# Patient Record
Sex: FEMALE | Race: WHITE | NOT HISPANIC OR LATINO | URBAN - NONMETROPOLITAN AREA
[De-identification: names, ages, dates, MRNs, and addresses within clinical notes are randomized per-mention and may not be internally consistent; named-entity substitution may affect disease eponyms.]

---

## 2017-02-08 PROBLEM — H52.4: Noted: 2017-02-08

## 2017-02-08 PROBLEM — E11.9: Noted: 2017-02-08

## 2017-02-08 PROBLEM — H52.223: Noted: 2017-02-08

## 2017-02-08 PROBLEM — H52.03: Noted: 2017-02-08

## 2017-02-08 PROBLEM — H25.13: Noted: 2017-02-08

## 2020-08-21 ENCOUNTER — IMPORTED ENCOUNTER (OUTPATIENT)
Dept: URBAN - NONMETROPOLITAN AREA CLINIC 1 | Facility: CLINIC | Age: 75
End: 2020-08-21

## 2020-08-21 PROCEDURE — 92015 DETERMINE REFRACTIVE STATE: CPT

## 2020-08-21 PROCEDURE — 92014 COMPRE OPH EXAM EST PT 1/>: CPT

## 2020-08-21 NOTE — PATIENT DISCUSSION
DM s DR-Stressed the importance of keeping blood sugars under control and regular visits with PCP. -Explained the possible effects of poorly controlled diabetes and the damage that diabetes can cause to ocular health. -Pt instructed to contact our office with any vision changes. Cataract OU +progression-Not yet surgical. -Reviewed symptoms of advancing cataract growth such as glare and halos and decreased vision.-Continue to monitor for now. Pt will notify us if any new symptoms develop.

## 2021-03-29 NOTE — PATIENT DISCUSSION
PT NEEDS  ( -[phone] OPHELIA RUSSO )WILL CALL AND DISCUSS PT'S DIAGNOSIS AND PLAN AS WELL AS SEND IN DROPS .

## 2021-04-05 ENCOUNTER — IMPORTED ENCOUNTER (OUTPATIENT)
Dept: URBAN - NONMETROPOLITAN AREA CLINIC 1 | Facility: CLINIC | Age: 76
End: 2021-04-05

## 2021-04-05 PROBLEM — E11.9: Noted: 2021-04-05

## 2021-04-05 PROBLEM — H52.4: Noted: 2017-02-08

## 2021-04-05 PROBLEM — H52.03: Noted: 2017-02-08

## 2021-04-05 PROBLEM — H25.813: Noted: 2021-04-30

## 2021-04-05 PROBLEM — H52.223: Noted: 2017-02-08

## 2021-04-05 PROBLEM — H25.13: Noted: 2021-04-05

## 2021-04-05 PROCEDURE — 92014 COMPRE OPH EXAM EST PT 1/>: CPT

## 2021-04-05 NOTE — PATIENT DISCUSSION
Cataract(s)-Visually significant.-Cataract(s) causing symptomatic impairment of visual function not correctable with a tolerable change in glasses or contact lenses lighting or non-operative means resulting in specific activity limitations and/or participation restrictions including but not limited to reading viewing television driving or meeting vocational or recreational needs. -Expectation is clear vision and reduced glare disability after cataract removal.-Refer to Dr Giuliana Castillo for cataract evaluation

## 2021-04-27 ENCOUNTER — IMPORTED ENCOUNTER (OUTPATIENT)
Dept: URBAN - NONMETROPOLITAN AREA CLINIC 1 | Facility: CLINIC | Age: 76
End: 2021-04-27

## 2021-04-30 ENCOUNTER — IMPORTED ENCOUNTER (OUTPATIENT)
Dept: URBAN - NONMETROPOLITAN AREA CLINIC 1 | Facility: CLINIC | Age: 76
End: 2021-04-30

## 2021-04-30 PROCEDURE — 92014 COMPRE OPH EXAM EST PT 1/>: CPT

## 2021-04-30 NOTE — PATIENT DISCUSSION
Cataract(s)-Visually significant cataract OU .-Cataract(s) causing symptomatic impairment of visual function not correctable with a tolerable change in glasses or contact lenses lighting or non-operative means resulting in specific activity limitations and/or participation restrictions including but not limited to reading viewing television driving or meeting vocational or recreational needs. -Expectation is clearer vision and functional improvement in symptoms as well as reduced glare disability after cataract removal.-Order IOLMaster and OPD today. -Recommend  Limbal Relaxing Incisions based on today's OPD testing and lifestyle questionnaire.-All questions were answered regarding surgery including pre and post-op medications appointments activity restrictions and anesthetic usage.-The risks benefits and alternatives and special risk factors for the patient were discussed in detail including but not limited to: bleeding infection retinal detachment vitreous loss problems with the implant and possible need for additional surgery.-Although rare the possibility of complete vision loss was discussed.-The possible need for glasses post-operatively was discussed.-Order medical clearance exam based on history of diabetes-Patient elects to proceed with cataract surgery OD . Will schedule at patient's convenience and re-evaluate OS  in the future. Discussed all lens. Pt elects Amblyopia OD discussed realistic [facility] expectations s/p CE OU and she expressed understanding. Explained OD will never be as strong as OS. Discussed LenSX OU Qualifies for LRI and explained benefits w/ less dependence on glasses for distance but informed her she will need reading glasses indefinite. Discussed dense nature of cataracts and healing time. Post op inflammation anticipated  discussed dextenza insertion afer surgery.

## 2021-05-14 NOTE — PATIENT DISCUSSION
ISADORA spoke with patient's son via phone. 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  Plan: lid scrubs with tea tree oil, warm compresses, zpak x1, artificial tears.

## 2021-05-14 NOTE — PATIENT DISCUSSION
5/14/21 ISADORA: ISADORA spoke with patient's son via phone in office today. There was some confusion about patient's plan after seeing Dr. Lizz Wilkes for cataract consult. Patient needs an HVF 24-2 prior to cataract surgery. Baseline RNFL obtained today; WNL. Will have to call to schedule HVF and then call and speak with son about options for cataract surgery. Pending HVF results - may be candidate up to advanced, but as of now definitely candidate up to  greg ARELLANO.

## 2021-05-19 ENCOUNTER — IMPORTED ENCOUNTER (OUTPATIENT)
Dept: URBAN - NONMETROPOLITAN AREA CLINIC 1 | Facility: CLINIC | Age: 76
End: 2021-05-19

## 2021-05-19 PROBLEM — E78.5: Noted: 2021-05-19

## 2021-05-19 PROBLEM — I10: Noted: 2021-05-19

## 2021-05-19 PROBLEM — E11.9: Noted: 2021-05-19

## 2021-05-19 PROBLEM — H25.813: Noted: 2021-05-19

## 2021-05-19 PROBLEM — Z01.818: Noted: 2021-05-19

## 2021-05-27 ENCOUNTER — IMPORTED ENCOUNTER (OUTPATIENT)
Dept: URBAN - NONMETROPOLITAN AREA CLINIC 1 | Facility: CLINIC | Age: 76
End: 2021-05-27

## 2021-05-27 PROBLEM — Z01.818: Noted: 2021-05-27

## 2021-05-27 PROBLEM — H25.813: Noted: 2021-05-27

## 2021-05-27 PROBLEM — E78.5: Noted: 2021-05-27

## 2021-05-27 PROBLEM — I10: Noted: 2021-05-27

## 2021-05-27 PROBLEM — Z98.41: Noted: 2021-05-27

## 2021-05-27 PROBLEM — E11.9: Noted: 2021-05-27

## 2021-05-27 PROCEDURE — 92136 OPHTHALMIC BIOMETRY: CPT

## 2021-05-27 PROCEDURE — 0356T INSERTION OF DRUG-ELUTING IMPLANT (INCLUDING PUNCTAL DILATION AND IMPLANT REMOVAL WHEN PERFORMED) INTO LACRIMAL CANALICULUS, EACH: CPT

## 2021-05-27 PROCEDURE — 99024 POSTOP FOLLOW-UP VISIT: CPT

## 2021-05-27 PROCEDURE — 66984 XCAPSL CTRC RMVL W/O ECP: CPT

## 2021-05-27 PROCEDURE — 66999 UNLISTED PX ANT SEGMENT EYE: CPT

## 2021-05-27 NOTE — PATIENT DISCUSSION
s/p PCIOL LRI/LenSx IOL OD 5/27/21 JS-Pt doing well s/p PCIOL. -Continue post-op gtts according to instruction sheet and sleep with eye shield over eye for 7 nights.-Avoid bending at the waist lifting anything over 5lbs and dirty or catherine environments. -RTC .

## 2021-06-02 NOTE — PATIENT DISCUSSION
5/14/21 ISADORA: ISADORA spoke with patient's son via phone in office today. There was some confusion about patient's plan after seeing Dr. Adriano Zapien for cataract consult. Patient needs an HVF 24-2 prior to cataract surgery. Baseline RNFL obtained today; WNL. Will have to call to schedule HVF and then call and speak with son about options for cataract surgery. Pending HVF results - may be candidate up to advanced, but as of now definitely candidate up to AIDA ARELLANO.

## 2021-06-03 ENCOUNTER — IMPORTED ENCOUNTER (OUTPATIENT)
Dept: URBAN - NONMETROPOLITAN AREA CLINIC 1 | Facility: CLINIC | Age: 76
End: 2021-06-03

## 2021-06-03 PROBLEM — E11.9: Noted: 2021-05-27

## 2021-06-03 PROBLEM — I10: Noted: 2021-05-27

## 2021-06-03 PROBLEM — E78.5: Noted: 2021-05-27

## 2021-06-03 PROBLEM — Z01.818: Noted: 2021-06-03

## 2021-06-03 PROBLEM — Z98.41: Noted: 2021-06-03

## 2021-06-03 PROBLEM — H25.813: Noted: 2021-05-27

## 2021-06-03 PROCEDURE — 99024 POSTOP FOLLOW-UP VISIT: CPT

## 2021-06-03 NOTE — PATIENT DISCUSSION
Cataract(s)-Visually significant cataract OS . -Cataract(s) causing symptomatic impairment of visual function not correctable with a tolerable change in glasses or contact lenses lighting or non-operative means resulting in specific activity limitations and/or participation restrictions including but not limited to reading viewing television driving or meeting vocational or recreational needs. -Expectation is clearer vision and functional improvement in symptoms as well as reduced glare disability after cataract removal.-Recommend Limbal Relaxing Incisions based on previous OPD testing and lifestyle questionnaire.-All questions were answered regarding surgery including pre and post-op medications appointments activity restrictions and anesthetic usage.-The risks benefits and alternatives and special risk factors for the patient were discussed in detail including but not limited to: bleeding infection retinal detachment vitreous loss problems with the implant and possible need for additional surgery.-Although rare the possibility of complete vision loss was discussed.-The need for glasses post-operatively was discussed.-Patient elects to proceed with cataract surgery OS . Will schedule at patient's convenience. s/p PCIOL OD-Pt doing well at 1 week s/p PCIOL. -Continue post-op gtts according to instruction sheet.-Okay to resume usual activites and d/c eye shield.

## 2021-06-09 NOTE — PATIENT DISCUSSION
5/14/21 ISADORA: ISADORA spoke with patient's son via phone in office today. There was some confusion about patient's plan after seeing Dr. Vivienne Reeves for cataract consult. Patient needs an HVF 24-2 prior to cataract surgery. Baseline RNFL obtained today; WNL. Will have to call to schedule HVF and then call and speak with son about options for cataract surgery. Pending HVF results - may be candidate up to advanced, but as of now definitely candidate up to AIDA ARELLANO.

## 2021-07-22 ENCOUNTER — IMPORTED ENCOUNTER (OUTPATIENT)
Dept: URBAN - NONMETROPOLITAN AREA CLINIC 1 | Facility: CLINIC | Age: 76
End: 2021-07-22

## 2021-07-30 ENCOUNTER — IMPORTED ENCOUNTER (OUTPATIENT)
Dept: URBAN - NONMETROPOLITAN AREA CLINIC 1 | Facility: CLINIC | Age: 76
End: 2021-07-30

## 2021-07-30 PROBLEM — E11.9: Noted: 2021-07-30

## 2021-07-30 PROBLEM — Z98.42: Noted: 2021-07-30

## 2021-07-30 PROBLEM — Z98.41: Noted: 2021-07-30

## 2021-07-30 PROCEDURE — 99024 POSTOP FOLLOW-UP VISIT: CPT

## 2021-07-30 NOTE — PATIENT DISCUSSION
s/p PCIOL LRI/LenSx IOL OS 7/29/21 JS-Pt doing well s/p PCIOL. -Continue post-op gtts according to instruction sheet and sleep with eye shield over eye for 7 nights.-Avoid bending at the waist lifting anything over 5lbs and dirty or catherine environments. -RTC .

## 2021-08-05 ENCOUNTER — IMPORTED ENCOUNTER (OUTPATIENT)
Dept: URBAN - NONMETROPOLITAN AREA CLINIC 1 | Facility: CLINIC | Age: 76
End: 2021-08-05

## 2021-08-05 PROCEDURE — 99024 POSTOP FOLLOW-UP VISIT: CPT

## 2021-08-05 NOTE — PATIENT DISCUSSION
s/p PCIOL LRI/LenSx IOL OS 7/29/21-Pt doing well at 1 week s/p PCIOL. -Continue post-op gtts according to instruction sheet.-Okay to resume usual activites and d/c eye shield.

## 2021-12-02 ENCOUNTER — IMPORTED ENCOUNTER (OUTPATIENT)
Dept: URBAN - NONMETROPOLITAN AREA CLINIC 1 | Facility: CLINIC | Age: 76
End: 2021-12-02

## 2021-12-02 PROBLEM — Z96.1: Noted: 2021-12-02

## 2021-12-02 PROBLEM — E11.9: Noted: 2021-12-02

## 2021-12-02 PROCEDURE — 92014 COMPRE OPH EXAM EST PT 1/>: CPT

## 2021-12-02 NOTE — PATIENT DISCUSSION
s/p PCIOL-Stable PCIOL OU.-Monitor for PCO. DM s DR-Stressed the importance of keeping blood sugars under control blood pressure under control and weight normalization and regular visits with PCP. -Explained the possible effects of poorly controlled diabetes and the damage that diabetes can cause to ocular health. -Patient to check HgbA1C.-Pt instructed to contact our office with any vision changes.

## 2022-04-10 ASSESSMENT — VISUAL ACUITY
OD_PAM: 20/25
OD_CC: 20/40
OD_SC: 20/400 BLURRY
OS_CC: 20/40+2
OD_SC: 20/400-
OS_CC: J1
OS_SC: 20/60
OS_GLARE: 20/50
OD_CC: 20/40 BLURRY
OD_PH: 20/100
OS_CC: J2
OS_AM: 20/25
OD_PH: 20/40-1
OS_CC: 20/30
OU_CC: 20/25
OD_CC: 20/30
OD_CC: 20/25-
OS_CC: 20/40-1
OD_CC: J1
OS_GLARE: 20/50
OD_CC: 20/200
OD_SC: 20/50-2
OS_CC: 20/60
OD_PH: 20/40-1
OS_SC: 20/30
OS_SC: 20/60-1 BLURRY
OD_CC: J2
OD_PH: 20/60
OS_CC: 20/20-2
OD_CC: 20/80+2
OS_AM: 20/25
OD_GLARE: 20/400

## 2022-04-10 ASSESSMENT — TONOMETRY
OS_IOP_MMHG: 16
OS_IOP_MMHG: 18
OS_IOP_MMHG: 16
OD_IOP_MMHG: 19
OS_IOP_MMHG: 14
OD_IOP_MMHG: 18
OD_IOP_MMHG: 16
OD_IOP_MMHG: 18
OD_IOP_MMHG: 18
OS_IOP_MMHG: 19
OD_IOP_MMHG: 16
OD_IOP_MMHG: 14
OS_IOP_MMHG: 18
OD_IOP_MMHG: 15
OS_IOP_MMHG: 16

## 2024-01-31 ENCOUNTER — ESTABLISHED PATIENT (OUTPATIENT)
Dept: RURAL CLINIC 1 | Facility: CLINIC | Age: 79
End: 2024-01-31

## 2024-01-31 DIAGNOSIS — Z96.1: ICD-10-CM

## 2024-01-31 DIAGNOSIS — E11.9: ICD-10-CM

## 2024-01-31 PROCEDURE — 92014 COMPRE OPH EXAM EST PT 1/>: CPT

## 2024-01-31 ASSESSMENT — VISUAL ACUITY
OS_SC: 20/30-1
OU_SC: 20/20-1
OD_SC: 20/40-1

## 2024-01-31 ASSESSMENT — TONOMETRY
OD_IOP_MMHG: 14
OS_IOP_MMHG: 14